# Patient Record
Sex: FEMALE | Race: WHITE | URBAN - NONMETROPOLITAN AREA
[De-identification: names, ages, dates, MRNs, and addresses within clinical notes are randomized per-mention and may not be internally consistent; named-entity substitution may affect disease eponyms.]

---

## 2019-01-18 PROBLEM — G35 MS (MULTIPLE SCLEROSIS) (HCC): Status: ACTIVE | Noted: 2019-01-18

## 2019-01-18 PROBLEM — E78.2 MIXED HYPERLIPIDEMIA: Status: ACTIVE | Noted: 2019-01-18

## 2019-01-18 PROBLEM — R07.1 CHEST PAIN ON BREATHING: Status: ACTIVE | Noted: 2019-01-18

## 2019-01-18 PROBLEM — I10 HTN (HYPERTENSION), BENIGN: Status: ACTIVE | Noted: 2019-01-18

## 2020-06-04 ENCOUNTER — OFFICE VISIT (OUTPATIENT)
Dept: FAMILY MEDICINE CLINIC | Facility: CLINIC | Age: 55
End: 2020-06-04
Payer: COMMERCIAL

## 2020-06-04 VITALS
SYSTOLIC BLOOD PRESSURE: 122 MMHG | WEIGHT: 261 LBS | OXYGEN SATURATION: 96 % | HEIGHT: 67 IN | DIASTOLIC BLOOD PRESSURE: 74 MMHG | RESPIRATION RATE: 14 BRPM | HEART RATE: 66 BPM | BODY MASS INDEX: 40.97 KG/M2 | TEMPERATURE: 98 F

## 2020-06-04 DIAGNOSIS — M25.512 CHRONIC LEFT SHOULDER PAIN: ICD-10-CM

## 2020-06-04 DIAGNOSIS — F43.29 STRESS AND ADJUSTMENT REACTION: ICD-10-CM

## 2020-06-04 DIAGNOSIS — M54.6 BILATERAL THORACIC BACK PAIN, UNSPECIFIED CHRONICITY: ICD-10-CM

## 2020-06-04 DIAGNOSIS — M54.50 LOW BACK PAIN RADIATING TO LEFT LEG: Primary | ICD-10-CM

## 2020-06-04 DIAGNOSIS — M54.2 NECK PAIN: ICD-10-CM

## 2020-06-04 DIAGNOSIS — G89.29 CHRONIC LEFT SHOULDER PAIN: ICD-10-CM

## 2020-06-04 DIAGNOSIS — M79.605 LOW BACK PAIN RADIATING TO LEFT LEG: Primary | ICD-10-CM

## 2020-06-04 PROCEDURE — 99204 OFFICE O/P NEW MOD 45 MIN: CPT | Performed by: FAMILY MEDICINE

## 2020-06-04 PROCEDURE — 98929 OSTEOPATH MANJ 9-10 REGIONS: CPT | Performed by: FAMILY MEDICINE

## 2020-06-04 NOTE — PROGRESS NOTES
HPI:    Patient ID: Edwina Lopez is a 47year old female. Patient with chronic left shoulder pain. Upper back pain. Neck pain. Left lower back and hip pain. Stress high. Has had for years. Without trauma. Has MS.   Difficulties with family issues tight  Quad 0/0  Decreased quadratus lumborum/laterals  Decreased shoulder/sternocleidomastoid  Elevated first rib  Piriformis 80 degrees  Decrease hamstring bilateral  Activations x9  Abdominal breathing  Psoas/glutes 2/2  Hamstring 80 degrees  Psoas [place]

## 2020-08-27 ENCOUNTER — TELEPHONE (OUTPATIENT)
Dept: OBGYN CLINIC | Facility: CLINIC | Age: 55
End: 2020-08-27

## 2020-08-27 NOTE — TELEPHONE ENCOUNTER
Pt was no show for her appt today with Dr. Slava Mcdaniel at 1:30 pm today in [place]. LM to pt to call office back if wants to R/S and that she will get charge no show fee for today.

## 2020-09-01 PROBLEM — I34.0 NONRHEUMATIC MITRAL VALVE REGURGITATION: Status: ACTIVE | Noted: 2020-09-01

## 2020-09-01 PROBLEM — I25.10 CORONARY ARTERY DISEASE INVOLVING NATIVE CORONARY ARTERY OF NATIVE HEART WITHOUT ANGINA PECTORIS: Status: ACTIVE | Noted: 2020-09-01

## 2020-09-01 PROBLEM — G47.33 OSA (OBSTRUCTIVE SLEEP APNEA): Status: ACTIVE | Noted: 2020-09-01

## 2020-10-07 ENCOUNTER — OFFICE VISIT (OUTPATIENT)
Dept: FAMILY MEDICINE CLINIC | Facility: CLINIC | Age: 55
End: 2020-10-07
Payer: COMMERCIAL

## 2020-10-07 VITALS
HEART RATE: 60 BPM | DIASTOLIC BLOOD PRESSURE: 88 MMHG | TEMPERATURE: 97 F | WEIGHT: 256 LBS | HEIGHT: 65 IN | BODY MASS INDEX: 42.65 KG/M2 | OXYGEN SATURATION: 95 % | SYSTOLIC BLOOD PRESSURE: 118 MMHG | RESPIRATION RATE: 12 BRPM

## 2020-10-07 DIAGNOSIS — G57.03 PIRIFORMIS SYNDROME OF BOTH SIDES: ICD-10-CM

## 2020-10-07 DIAGNOSIS — M54.50 LOW BACK PAIN RADIATING TO LEFT LEG: ICD-10-CM

## 2020-10-07 DIAGNOSIS — M79.604 PAIN OF RIGHT LOWER EXTREMITY: ICD-10-CM

## 2020-10-07 DIAGNOSIS — M54.2 NECK PAIN: Primary | ICD-10-CM

## 2020-10-07 DIAGNOSIS — M79.605 LOW BACK PAIN RADIATING TO LEFT LEG: ICD-10-CM

## 2020-10-07 DIAGNOSIS — F43.29 STRESS AND ADJUSTMENT REACTION: ICD-10-CM

## 2020-10-07 PROCEDURE — 98929 OSTEOPATH MANJ 9-10 REGIONS: CPT | Performed by: FAMILY MEDICINE

## 2020-10-07 PROCEDURE — 3079F DIAST BP 80-89 MM HG: CPT | Performed by: FAMILY MEDICINE

## 2020-10-07 PROCEDURE — 3008F BODY MASS INDEX DOCD: CPT | Performed by: FAMILY MEDICINE

## 2020-10-07 PROCEDURE — 3074F SYST BP LT 130 MM HG: CPT | Performed by: FAMILY MEDICINE

## 2020-10-07 PROCEDURE — 99214 OFFICE O/P EST MOD 30 MIN: CPT | Performed by: FAMILY MEDICINE

## 2020-10-07 NOTE — PROGRESS NOTES
HPI:    Patient ID: Gloria Chowdhury is a 54year old female. Patient with complaints of left lower back and leg pain. Chronic. Right leg pain over the last month. Denies trauma. Neck pain. Without numbness, tingling. Without swelling.   High stress d Pulmonary/Chest: Effort normal and breath sounds normal.   Abdominal: Soft. Bowel sounds are normal.   Skin: Skin is warm and dry. Vitals reviewed.   /88 (BP Location: Right arm, Patient Position: Sitting, Cuff Size: large)   Pulse 60   Temp 97.3

## 2021-01-27 ENCOUNTER — OFFICE VISIT (OUTPATIENT)
Dept: FAMILY MEDICINE CLINIC | Facility: CLINIC | Age: 56
End: 2021-01-27
Payer: COMMERCIAL

## 2021-01-27 ENCOUNTER — LAB ENCOUNTER (OUTPATIENT)
Dept: LAB | Age: 56
End: 2021-01-27
Attending: NURSE PRACTITIONER
Payer: COMMERCIAL

## 2021-01-27 VITALS
OXYGEN SATURATION: 97 % | SYSTOLIC BLOOD PRESSURE: 130 MMHG | RESPIRATION RATE: 16 BRPM | DIASTOLIC BLOOD PRESSURE: 72 MMHG | WEIGHT: 261.38 LBS | HEART RATE: 62 BPM | HEIGHT: 65 IN | TEMPERATURE: 97 F | BODY MASS INDEX: 43.55 KG/M2

## 2021-01-27 DIAGNOSIS — G35 MS (MULTIPLE SCLEROSIS) (HCC): ICD-10-CM

## 2021-01-27 DIAGNOSIS — G47.33 OSA (OBSTRUCTIVE SLEEP APNEA): ICD-10-CM

## 2021-01-27 DIAGNOSIS — G47.61 PLMD (PERIODIC LIMB MOVEMENT DISORDER): ICD-10-CM

## 2021-01-27 DIAGNOSIS — R06.83 SNORING: ICD-10-CM

## 2021-01-27 DIAGNOSIS — G47.33 OSA (OBSTRUCTIVE SLEEP APNEA): Primary | ICD-10-CM

## 2021-01-27 LAB
DEPRECATED HBV CORE AB SER IA-ACNC: 49.7 NG/ML
IRON SATURATION: 23 %
IRON SERPL-MCNC: 88 UG/DL
TOTAL IRON BINDING CAPACITY: 377 UG/DL (ref 240–450)
TRANSFERRIN SERPL-MCNC: 253 MG/DL (ref 200–360)
VIT B12 SERPL-MCNC: 493 PG/ML (ref 193–986)
VIT D+METAB SERPL-MCNC: 81 NG/ML (ref 30–100)

## 2021-01-27 PROCEDURE — 3008F BODY MASS INDEX DOCD: CPT | Performed by: NURSE PRACTITIONER

## 2021-01-27 PROCEDURE — 3078F DIAST BP <80 MM HG: CPT | Performed by: NURSE PRACTITIONER

## 2021-01-27 PROCEDURE — 82306 VITAMIN D 25 HYDROXY: CPT | Performed by: NURSE PRACTITIONER

## 2021-01-27 PROCEDURE — 82607 VITAMIN B-12: CPT | Performed by: NURSE PRACTITIONER

## 2021-01-27 PROCEDURE — 82728 ASSAY OF FERRITIN: CPT | Performed by: NURSE PRACTITIONER

## 2021-01-27 PROCEDURE — 3075F SYST BP GE 130 - 139MM HG: CPT | Performed by: NURSE PRACTITIONER

## 2021-01-27 PROCEDURE — 36415 COLL VENOUS BLD VENIPUNCTURE: CPT | Performed by: NURSE PRACTITIONER

## 2021-01-27 PROCEDURE — 83550 IRON BINDING TEST: CPT | Performed by: NURSE PRACTITIONER

## 2021-01-27 PROCEDURE — 99214 OFFICE O/P EST MOD 30 MIN: CPT | Performed by: NURSE PRACTITIONER

## 2021-01-27 PROCEDURE — 83540 ASSAY OF IRON: CPT | Performed by: NURSE PRACTITIONER

## 2021-01-27 RX ORDER — GABAPENTIN 300 MG/1
CAPSULE ORAL
COMMUNITY
Start: 2020-10-26

## 2021-01-27 NOTE — PATIENT INSTRUCTIONS
Schedule sleep study in [place] - call [phone]. Labs pending. Follow up about 10 - 14 days after the study to review the results with Dr. Ann Marie Bourne or Domi Bedolla.      Warned if still with sleep apnea and not using CPAP has 7 fold increased risk and hea

## 2021-01-27 NOTE — PROGRESS NOTES
[facility]        HPI:   This is a 54year old female coming in for Patient presents with:  Obstructive Sleep Apnea (KERRIE): consult      HPI:     Present for a sleep consult.  States that 11 years ago had a sleep study Surgical History:   Procedure Laterality Date   • ANGIOGRAM  2019    Mild to moderate coronary artery disease, Normal left ventricular function.    • CHOLECYSTECTOMY     • HC  SECTION LEVEL I     • SPINAL FUSION     • TONSILLECTOMY       Socia Take 1 tablet by mouth 2 (two) times daily.         Counseling given: Not Answered         Problem List:  Patient Active Problem List:     Chest pain on breathing     HTN (hypertension), benign     Mixed hyperlipidemia     [facility] ([facility] Conjunctivae are normal.   Neck: Normal range of motion. Neck supple. No thyromegaly present. Cardiovascular: Normal rate, regular rhythm, normal heart sounds and intact distal pulses.    Pulmonary/Chest: Effort normal and breath sounds normal. No respira stroke, abnormal heart rhythm  and death,  increased risk of driving accidents. Advised to refrain from driving when sleepy. COMPLIANCE is required by insurance for 4 hours a night most nights of the week.   Recommend weight loss, and maintain and opti

## 2021-01-28 ENCOUNTER — TELEPHONE (OUTPATIENT)
Dept: FAMILY MEDICINE CLINIC | Facility: CLINIC | Age: 56
End: 2021-01-28

## 2021-01-28 NOTE — TELEPHONE ENCOUNTER
----- Message from SAYDA Burdick sent at 1/28/2021  8:16 AM CST -----  Iron and vitamin levels are good. Continue current supplements.

## 2021-06-10 ENCOUNTER — OFFICE VISIT (OUTPATIENT)
Dept: FAMILY MEDICINE CLINIC | Facility: CLINIC | Age: 56
End: 2021-06-10
Payer: COMMERCIAL

## 2021-06-10 VITALS
HEIGHT: 65 IN | OXYGEN SATURATION: 96 % | BODY MASS INDEX: 42.65 KG/M2 | WEIGHT: 256 LBS | DIASTOLIC BLOOD PRESSURE: 68 MMHG | TEMPERATURE: 98 F | SYSTOLIC BLOOD PRESSURE: 112 MMHG | HEART RATE: 60 BPM

## 2021-06-10 DIAGNOSIS — T50.B95A: ICD-10-CM

## 2021-06-10 DIAGNOSIS — M54.2 NECK PAIN: Primary | ICD-10-CM

## 2021-06-10 DIAGNOSIS — M54.6 ACUTE LEFT-SIDED THORACIC BACK PAIN: ICD-10-CM

## 2021-06-10 DIAGNOSIS — G57.03 PIRIFORMIS SYNDROME OF BOTH SIDES: ICD-10-CM

## 2021-06-10 PROCEDURE — 3008F BODY MASS INDEX DOCD: CPT | Performed by: FAMILY MEDICINE

## 2021-06-10 PROCEDURE — 99214 OFFICE O/P EST MOD 30 MIN: CPT | Performed by: FAMILY MEDICINE

## 2021-06-10 PROCEDURE — 98929 OSTEOPATH MANJ 9-10 REGIONS: CPT | Performed by: FAMILY MEDICINE

## 2021-06-10 PROCEDURE — 3078F DIAST BP <80 MM HG: CPT | Performed by: FAMILY MEDICINE

## 2021-06-10 PROCEDURE — 3074F SYST BP LT 130 MM HG: CPT | Performed by: FAMILY MEDICINE

## 2021-06-10 NOTE — PROGRESS NOTES
HPI/Subjective:   Patient ID: Oc Matthews is a 64year old female. Patient for the last couple weeks increased left neck pain. Denies trauma. Recent vaccine–[employer]. Swollen lymph nodes. Denies fever, chills. Denies numbness, tingling, weakness. sounds are normal.      Palpations: Abdomen is soft. Lymphadenopathy:      Cervical: Cervical adenopathy present. Neurological:      General: No focal deficit present. Mental Status: She is alert and oriented to person, place, and time.         BP

## 2022-07-08 ENCOUNTER — TELEPHONE (OUTPATIENT)
Dept: FAMILY MEDICINE CLINIC | Facility: CLINIC | Age: 57
End: 2022-07-08

## 2022-07-08 NOTE — TELEPHONE ENCOUNTER
Dr. Joseph Yepez said he would take Remedios on as a NP. Her daughter tested positive for covid and she thinks she has it. She wants to know where she can be seen but does not want wait 6 hrs. At urgent care.

## 2022-07-08 NOTE — TELEPHONE ENCOUNTER
Sx started yesterday. Reports fever, chills, HA, dry cough. She denies SOB or chest pain. Per pt, she has a hx of MS, hypothyroidism and obesity. She is interested in the antibody infusion. Referred pt to IC. Infusion is offered on a case-by-case basis. She would need to be seen by a provider at the [address] [place] [address]. Advised pt that they do not offer the infusion at the [facility]. Pt verbalized understanding.